# Patient Record
Sex: FEMALE | Race: WHITE | NOT HISPANIC OR LATINO | URBAN - METROPOLITAN AREA
[De-identification: names, ages, dates, MRNs, and addresses within clinical notes are randomized per-mention and may not be internally consistent; named-entity substitution may affect disease eponyms.]

---

## 2020-08-27 ENCOUNTER — CLAIMS CREATED FROM THE CLAIM WINDOW (OUTPATIENT)
Dept: URBAN - METROPOLITAN AREA CLINIC 4 | Facility: CLINIC | Age: 55
End: 2020-08-27
Payer: COMMERCIAL

## 2020-08-27 ENCOUNTER — OFFICE VISIT (OUTPATIENT)
Dept: URBAN - METROPOLITAN AREA SURGERY CENTER 16 | Facility: SURGERY CENTER | Age: 55
End: 2020-08-27
Payer: COMMERCIAL

## 2020-08-27 DIAGNOSIS — D12.5 BENIGN NEOPLASM OF SIGMOID COLON: ICD-10-CM

## 2020-08-27 DIAGNOSIS — K63.5 BENIGN COLON POLYP: ICD-10-CM

## 2020-08-27 DIAGNOSIS — Z86.010 H/O ADENOMATOUS POLYP OF COLON: ICD-10-CM

## 2020-08-27 PROCEDURE — 45380 COLONOSCOPY AND BIOPSY: CPT | Performed by: INTERNAL MEDICINE

## 2020-08-27 PROCEDURE — 88305 TISSUE EXAM BY PATHOLOGIST: CPT | Performed by: PATHOLOGY

## 2020-08-27 PROCEDURE — G9935 CANC NOT DETECTD DURING SRCN: HCPCS | Performed by: INTERNAL MEDICINE

## 2020-08-27 PROCEDURE — G8907 PT DOC NO EVENTS ON DISCHARG: HCPCS | Performed by: INTERNAL MEDICINE

## 2020-08-27 RX ORDER — ERGOCALCIFEROL 1.25 MG/1
CAPSULE ORAL
Qty: 0 | Refills: 0 | Status: ACTIVE | COMMUNITY
Start: 1900-01-01 | End: 1900-01-01

## 2020-10-16 ENCOUNTER — OFFICE VISIT (OUTPATIENT)
Dept: URBAN - METROPOLITAN AREA CLINIC 92 | Facility: CLINIC | Age: 55
End: 2020-10-16

## 2020-10-16 RX ORDER — ERGOCALCIFEROL 1.25 MG/1
CAPSULE ORAL
Qty: 0 | Refills: 0 | Status: ACTIVE | COMMUNITY
Start: 1900-01-01

## 2020-10-16 NOTE — HPI-TODAY'S VISIT:
This is a 54-year-old female who now presents for follow-up.  She has a personal history of genetic mutation with FAP.  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  She initially had a colonoscopy on 05/13/2016 that demonstrated diverticulosis and a hyperplastic polyp.  She underwent a subsequent colonoscopy on 08/27/2020 that demonstrated diverticulosis in the descending and sigmoid colon.  There were 3 hyperplastic polyps in the rectosigmoid as well as internal hemorrhoids.  She has longstanding GERD and had EGD on 03/18/2016 that showed reflux esophagitis and gastritis without H pylori.  She is currently taking Prilosec as needed with good control of reflux symptoms.

## 2020-12-11 ENCOUNTER — OFFICE VISIT (OUTPATIENT)
Dept: URBAN - METROPOLITAN AREA CLINIC 92 | Facility: CLINIC | Age: 55
End: 2020-12-11
Payer: COMMERCIAL

## 2020-12-11 VITALS
HEART RATE: 57 BPM | DIASTOLIC BLOOD PRESSURE: 66 MMHG | WEIGHT: 143 LBS | TEMPERATURE: 94.7 F | BODY MASS INDEX: 22.98 KG/M2 | HEIGHT: 66 IN | SYSTOLIC BLOOD PRESSURE: 100 MMHG

## 2020-12-11 DIAGNOSIS — D12.6 FAP (FAMILIAL ADENOMATOUS POLYPOSIS): ICD-10-CM

## 2020-12-11 DIAGNOSIS — K57.90 DIVERTICULOSIS: ICD-10-CM

## 2020-12-11 DIAGNOSIS — K59.01 CONSTIPATION: ICD-10-CM

## 2020-12-11 DIAGNOSIS — K21.9 GERD (GASTROESOPHAGEAL REFLUX DISEASE): ICD-10-CM

## 2020-12-11 PROBLEM — 235595009 GASTROESOPHAGEAL REFLUX DISEASE: Status: ACTIVE | Noted: 2020-10-16

## 2020-12-11 PROCEDURE — 99214 OFFICE O/P EST MOD 30 MIN: CPT | Performed by: INTERNAL MEDICINE

## 2020-12-11 PROCEDURE — 3017F COLORECTAL CA SCREEN DOC REV: CPT | Performed by: INTERNAL MEDICINE

## 2020-12-11 PROCEDURE — G8417 CALC BMI ABV UP PARAM F/U: HCPCS | Performed by: INTERNAL MEDICINE

## 2020-12-11 PROCEDURE — G8482 FLU IMMUNIZE ORDER/ADMIN: HCPCS | Performed by: INTERNAL MEDICINE

## 2020-12-11 PROCEDURE — G8427 DOCREV CUR MEDS BY ELIG CLIN: HCPCS | Performed by: INTERNAL MEDICINE

## 2020-12-11 PROCEDURE — 1036F TOBACCO NON-USER: CPT | Performed by: INTERNAL MEDICINE

## 2020-12-11 RX ORDER — ERGOCALCIFEROL 1.25 MG/1
CAPSULE ORAL
Qty: 0 | Refills: 0 | Status: ACTIVE | COMMUNITY
Start: 1900-01-01

## 2020-12-11 NOTE — HPI-TODAY'S VISIT:
This is a 54-year-old female who now presents for follow-up.  She has a personal history of genetic mutation with FAP.  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  She initially had a colonoscopy on 05/13/2016 that demonstrated diverticulosis and a hyperplastic polyp.  She underwent a subsequent colonoscopy on 08/27/2020 that demonstrated diverticulosis in the descending and sigmoid colon.  There were 3 hyperplastic polyps in the rectosigmoid as well as internal hemorrhoids.  She has longstanding GERD and had EGD on 03/18/2016 that showed reflux esophagitis and gastritis without H pylori.  She is currently taking Prilosec as needed with good control of reflux symptoms.   Patient has chronic constipation with infrequent bowel movement every 2-3 days associated with scant amount of rectal bleeding secondary to internal hemorrhoids.  She also has associated lower quadrant abdominal pain and bloating .  She reports having irritation and discomfort due to the hemorrhoids.

## 2022-03-09 PROBLEM — 428283002 HISTORY OF POLYP OF COLON: Status: ACTIVE | Noted: 2022-03-09

## 2022-03-29 ENCOUNTER — OFFICE VISIT (OUTPATIENT)
Dept: URBAN - METROPOLITAN AREA SURGERY CENTER 16 | Facility: SURGERY CENTER | Age: 57
End: 2022-03-29
Payer: COMMERCIAL

## 2022-03-29 DIAGNOSIS — Z86.010 ADENOMAS PERSONAL HISTORY OF COLONIC POLYPS: ICD-10-CM

## 2022-03-29 PROCEDURE — G0105 COLORECTAL SCRN; HI RISK IND: HCPCS | Performed by: INTERNAL MEDICINE

## 2022-03-29 PROCEDURE — G8907 PT DOC NO EVENTS ON DISCHARG: HCPCS | Performed by: INTERNAL MEDICINE

## 2022-03-29 RX ORDER — ERGOCALCIFEROL 1.25 MG/1
CAPSULE ORAL
Qty: 0 | Refills: 0 | Status: ACTIVE | COMMUNITY
Start: 1900-01-01

## 2023-12-04 ENCOUNTER — WEB ENCOUNTER (OUTPATIENT)
Dept: URBAN - METROPOLITAN AREA CLINIC 92 | Facility: CLINIC | Age: 58
End: 2023-12-04

## 2023-12-05 ENCOUNTER — DASHBOARD ENCOUNTERS (OUTPATIENT)
Age: 58
End: 2023-12-05

## 2023-12-05 ENCOUNTER — OFFICE VISIT (OUTPATIENT)
Dept: URBAN - METROPOLITAN AREA CLINIC 92 | Facility: CLINIC | Age: 58
End: 2023-12-05
Payer: COMMERCIAL

## 2023-12-05 VITALS
DIASTOLIC BLOOD PRESSURE: 62 MMHG | SYSTOLIC BLOOD PRESSURE: 93 MMHG | HEART RATE: 77 BPM | TEMPERATURE: 97 F | BODY MASS INDEX: 22.34 KG/M2 | HEIGHT: 66 IN | WEIGHT: 139 LBS

## 2023-12-05 DIAGNOSIS — B00.9 HERPES INFECTION: ICD-10-CM

## 2023-12-05 DIAGNOSIS — D12.6 FAP (FAMILIAL ADENOMATOUS POLYPOSIS): ICD-10-CM

## 2023-12-05 PROCEDURE — 99213 OFFICE O/P EST LOW 20 MIN: CPT

## 2023-12-05 RX ORDER — ERGOCALCIFEROL 1.25 MG/1
CAPSULE ORAL
Qty: 0 | Refills: 0 | Status: DISCONTINUED | COMMUNITY
Start: 1900-01-01

## 2023-12-05 NOTE — HPI-TODAY'S VISIT:
58-year-old female presents today with complaints of rectal pain.  Symptoms started on Friday and has been getting worse over time.  Patient has pain while having a bowel movement and without having a bowel movement.  She denies any rectal bleeding.  She did have some straining last week and has bowel movements every 2 to 3 days.  Typically when she has bowel movements they are looser.  She has no associated abdominal pain.  She has been trying sitz bath's and Preparation H cream a few times a day with minimal relief of symptoms.  She has a history of anal fissures in the 1994 had to get surgery. Her last colonoscopy was March 2022 this demonstrated diverticula in sigmoid and descending, internal hemorrhoids no specimens collected recommended repeat in 2 years.  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

## 2023-12-05 NOTE — PHYSICAL EXAM GASTROINTESTINAL
Abdomen,  soft, nontender, nondistended,  no guarding or rigidity,  no masses palpable,  normal bowel sounds Liver and Spleen,no hepatosplenomegaly Rectal multiple pustules around the anus. Dr. Moreno was present for rectal examination

## 2024-01-26 ENCOUNTER — OFFICE VISIT (OUTPATIENT)
Dept: URBAN - METROPOLITAN AREA SURGERY CENTER 16 | Facility: SURGERY CENTER | Age: 59
End: 2024-01-26

## 2024-03-07 ENCOUNTER — DAC (OUTPATIENT)
Dept: URBAN - METROPOLITAN AREA SURGERY CENTER 16 | Facility: SURGERY CENTER | Age: 59
End: 2024-03-07

## 2024-06-14 ENCOUNTER — LAB OUTSIDE AN ENCOUNTER (OUTPATIENT)
Dept: URBAN - METROPOLITAN AREA CLINIC 124 | Facility: CLINIC | Age: 59
End: 2024-06-14

## 2024-06-17 ENCOUNTER — OFFICE VISIT (OUTPATIENT)
Dept: URBAN - METROPOLITAN AREA CLINIC 124 | Facility: CLINIC | Age: 59
End: 2024-06-17
Payer: COMMERCIAL

## 2024-06-17 VITALS
BODY MASS INDEX: 22.79 KG/M2 | SYSTOLIC BLOOD PRESSURE: 99 MMHG | TEMPERATURE: 96.8 F | HEART RATE: 68 BPM | DIASTOLIC BLOOD PRESSURE: 64 MMHG | HEIGHT: 66 IN | WEIGHT: 141.8 LBS

## 2024-06-17 DIAGNOSIS — Z83.710 FAMILY HISTORY OF FAP (FAMILIAL ADENOMATOUS POLYPOSIS): ICD-10-CM

## 2024-06-17 DIAGNOSIS — K64.8 EXTERNAL HEMORRHOIDS: ICD-10-CM

## 2024-06-17 PROCEDURE — 99204 OFFICE O/P NEW MOD 45 MIN: CPT | Performed by: INTERNAL MEDICINE

## 2024-06-17 PROCEDURE — 99214 OFFICE O/P EST MOD 30 MIN: CPT | Performed by: INTERNAL MEDICINE

## 2024-06-17 NOTE — HPI-TODAY'S VISIT:
58-year-old female refby Dr Mag Muñoz for a gi fu visit. Pt was seen by our PAMary in 12/2023. Pt had c/o pain with BM and some straining then. Typically when she has bowel movements they are looser.  She has no associated abdominal pain.  She has been trying sitz bath's and Preparation H cream a few times a day with minimal relief of symptoms.  She has a history of anal fissures in the 1994 had to get surgery. Her last colonoscopy was March 2022 by Dr Matthews and this demonstrated diverticula in sigmoid and descending, internal hemorrhoids no specimens collected recommended repeat in 2 years.  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 Pt switched to me as she said she was told she owed $800 if she were to be done at [place] with Dr Matthews. Pt was told maybe I could get done at [facility] gi lab so she wanted to see me. Pt did an outside testing- by a [employer] and tested positive for variants of APC-[id number], p.[id number], p.[id number], p.[id number]. Pt takes a probiotic and she goes to bathroom about once a day or every other day with it. Pts paternal grandmother and her cousin on moms side. No colon cancer in the family. Pt moves her bowels every other day. No GERD or upper gi complaints and no anemia. Pt has 2 daughters and 8 grandkids.

## 2024-08-22 ENCOUNTER — CLAIMS CREATED FROM THE CLAIM WINDOW (OUTPATIENT)
Dept: URBAN - METROPOLITAN AREA SURGERY CENTER 16 | Facility: SURGERY CENTER | Age: 59
End: 2024-08-22
Payer: COMMERCIAL

## 2024-08-22 DIAGNOSIS — Z15.09 BIALLELIC MUTATION OF MSH6 GENE: ICD-10-CM

## 2024-08-22 DIAGNOSIS — K59.00 CONSTIPATION: ICD-10-CM

## 2024-08-22 DIAGNOSIS — K57.30 DIVERTICULA, COLON: ICD-10-CM

## 2024-08-22 DIAGNOSIS — Z86.010 ADENOMAS PERSONAL HISTORY OF COLONIC POLYPS: ICD-10-CM

## 2024-08-22 DIAGNOSIS — K64.8 HEMORRHOIDS, INTERNAL: ICD-10-CM

## 2024-08-22 PROCEDURE — G0105 COLORECTAL SCRN; HI RISK IND: HCPCS | Performed by: INTERNAL MEDICINE

## 2024-08-22 PROCEDURE — 00811 ANES LWR INTST NDSC NOS: CPT | Performed by: ANESTHESIOLOGIST ASSISTANT

## 2024-08-22 PROCEDURE — 00811 ANES LWR INTST NDSC NOS: CPT | Performed by: ANESTHESIOLOGY

## 2024-08-22 PROCEDURE — 45378 DIAGNOSTIC COLONOSCOPY: CPT | Performed by: INTERNAL MEDICINE

## 2024-09-23 ENCOUNTER — OFFICE VISIT (OUTPATIENT)
Dept: URBAN - METROPOLITAN AREA CLINIC 92 | Facility: CLINIC | Age: 59
End: 2024-09-23